# Patient Record
(demographics unavailable — no encounter records)

---

## 2025-06-20 NOTE — HEALTH RISK ASSESSMENT
[Good] : ~his/her~  mood as  good [Yes] : Yes [2 - 4 times a month (2 pts)] : 2-4 times a month (2 points) [1 or 2 (0 pts)] : 1 or 2 (0 points) [Never (0 pts)] : Never (0 points) [No] : In the past 12 months have you used drugs other than those required for medical reasons? No [No falls in past year] : Patient reported no falls in the past year [0] : 2) Feeling down, depressed, or hopeless: Not at all (0) [PHQ-2 Negative - No further assessment needed] : PHQ-2 Negative - No further assessment needed [Time Spent: ___ Minutes] : I spent [unfilled] minutes performing a depression screening for this patient. [None] : None [Fully functional (bathing, dressing, toileting, transferring, walking, feeding)] : Fully functional (bathing, dressing, toileting, transferring, walking, feeding) [Fully functional (using the telephone, shopping, preparing meals, housekeeping, doing laundry, using] : Fully functional and needs no help or supervision to perform IADLs (using the telephone, shopping, preparing meals, housekeeping, doing laundry, using transportation, managing medications and managing finances) [Patient/Caregiver not ready to engage] : , patient/caregiver not ready to engage [With Family] : lives with family [Employed] : employed [Single] : single [Current] : Current [de-identified] : Social [Audit-CScore] : 3 [de-identified] : Sports, walking [de-identified] : Regular [[id number]] : 0 [Change in mental status noted] : No change in mental status noted [Reports changes in hearing] : Reports no changes in hearing [Reports changes in vision] : Reports no changes in vision [Reports changes in dental health] : Reports no changes in dental health [AdvancecareDate] : 6/20/25 [de-identified] : 1 pack/week

## 2025-06-20 NOTE — HISTORY OF PRESENT ILLNESS
[FreeTextEntry1] : Establish Care, CPE, Gout, hemorrhoid, BRBPR [de-identified] : Mr. ULRICH is a 38 year old male that presents to the office for a CPE. Left foot gout flare x5 days. Medications: None PMHx:Remote hx of seizure (likely benzodiazepine withdrawal mediated)  Pt recently returned from [facility] 2.5 weeks ago - symptoms started about 5 days ago Now w/ left foot MTP joint gout  Has been taking an OTC ?supplement - no relief, no use of NSAIDs recently Last episode last year - occurs 2-3 times/year, usually controlled with diet  Having painless BRBPR - likely internal hemorrhoid  Works for [employer], [employer]  Gout: Advised Indomethacin - continue until symptoms resolve, usually 5-10 days - sent to pharmacy [facility] - has been >36 hours since flare, consider for future ppx: 1.2mg initially then 0.6mg one hour later Then 0.6mg BID PRN until symptoms resolve  Start fiber and stool softener for hemorrhoids, avoid straining, adequate hydration Sent in colace, use metamucil or equivalent Consider GI evaluation  Pt not fasting, labs ordered, pt will go to [facility] Provided letter for work Consider allopurinol for ppx if gout not adequately controlled with diet